# Patient Record
Sex: MALE | NOT HISPANIC OR LATINO | URBAN - METROPOLITAN AREA
[De-identification: names, ages, dates, MRNs, and addresses within clinical notes are randomized per-mention and may not be internally consistent; named-entity substitution may affect disease eponyms.]

---

## 2018-11-20 ENCOUNTER — IMPORTED ENCOUNTER (OUTPATIENT)
Dept: URBAN - METROPOLITAN AREA CLINIC 1 | Facility: CLINIC | Age: 53
End: 2018-11-20

## 2018-11-20 PROBLEM — H40.013: Noted: 2018-11-20

## 2018-11-20 PROBLEM — H25.13: Noted: 2018-11-20

## 2018-11-20 PROCEDURE — 92014 COMPRE OPH EXAM EST PT 1/>: CPT

## 2018-11-20 PROCEDURE — 92015 DETERMINE REFRACTIVE STATE: CPT

## 2018-11-20 NOTE — PATIENT DISCUSSION
1.  Cataracts OU -- Observe for now without intervention. The patient was advised to contact us if any change or worsening of vision. 2. Glaucoma Suspect OU (CD: 0.60 / 0.65) -- IOP stable today at 16 OU. Negative family h/o Glaucoma. Past w/u & testing was negative & WNL OU. Patient is considered Low Risk. Condition was discussed with patient and patient understands. Will continue to monitor patient for any progression in condition. Patient was advised to call us with any problems questions or concerns. Finalized Glasses MRx & given to patient today. Return for next available I&R appointment w/ Edelmira Kelley for an appointment in 3 WK for a CC OU (after I&R w/ Optical) with Dr. Dinah Leary.

## 2018-12-08 ENCOUNTER — IMPORTED ENCOUNTER (OUTPATIENT)
Dept: URBAN - METROPOLITAN AREA CLINIC 1 | Facility: CLINIC | Age: 53
End: 2018-12-08

## 2018-12-08 NOTE — PATIENT DISCUSSION
1.  CC today - Good fit good comfort. Va doing well finalized CTL RXReturn for an appointment in 1 year 30/cc with Dr. Christopher Acosta.

## 2019-12-02 ENCOUNTER — IMPORTED ENCOUNTER (OUTPATIENT)
Dept: URBAN - METROPOLITAN AREA CLINIC 1 | Facility: CLINIC | Age: 54
End: 2019-12-02

## 2019-12-02 PROBLEM — H40.013: Noted: 2019-12-02

## 2019-12-02 PROBLEM — H25.813: Noted: 2019-12-02

## 2019-12-02 PROCEDURE — 92015 DETERMINE REFRACTIVE STATE: CPT

## 2019-12-02 PROCEDURE — 92014 COMPRE OPH EXAM EST PT 1/>: CPT

## 2019-12-02 NOTE — PATIENT DISCUSSION
1.  Cataract OU: Observe for now without intervention. The patient was advised to contact us if any change or worsening of vision2. Glaucoma Suspect OU (CD 0.60/0.65): IOPP stable. Negative family hx. Past w/u negative. Patient is considered Low Risk. Condition was discussed with patient and patient understands. Will continue to monitor patient for any progression in condition. Patient was advised to call us with any problems questions or concerns. MRX for glasses given. CTL trials givenReturn for an appointment in Friday  with Dr. Unruly López.

## 2019-12-06 ENCOUNTER — IMPORTED ENCOUNTER (OUTPATIENT)
Dept: URBAN - METROPOLITAN AREA CLINIC 1 | Facility: CLINIC | Age: 54
End: 2019-12-06

## 2019-12-06 NOTE — PATIENT DISCUSSION
1. CC Today OU -- Good Fit Comfort and Vision OU. Finalized CTL Rx was given to patient today. Return for an appointment in 1 YR for a 27 / CC OU with Dr. Rosales Crockett. Discussed with patient best corrected vision will be achieved with glasses. Discussed with patient improving distance visual acuity may negatively affect near visual acuity and vice versa. Patient understands may need to wear OTC readers to read fine print.

## 2021-02-06 ENCOUNTER — IMPORTED ENCOUNTER (OUTPATIENT)
Dept: URBAN - METROPOLITAN AREA CLINIC 1 | Facility: CLINIC | Age: 56
End: 2021-02-06

## 2021-02-06 ENCOUNTER — PREPPED CHART (OUTPATIENT)
Dept: URBAN - METROPOLITAN AREA CLINIC 1 | Facility: CLINIC | Age: 56
End: 2021-02-06

## 2021-02-06 PROBLEM — H25.813: Noted: 2021-02-06

## 2021-02-06 PROBLEM — H40.013: Noted: 2021-02-06

## 2021-02-06 PROCEDURE — 99214 OFFICE O/P EST MOD 30 MIN: CPT

## 2021-02-06 PROCEDURE — 92015 DETERMINE REFRACTIVE STATE: CPT

## 2021-02-06 NOTE — PATIENT DISCUSSION
1.  Cataract OU: Observe for now without intervention. The patient was advised to contact us if any change or worsening of vision2. Glaucoma Suspect OU (CD 0.60/0.65): IOP stable at 15/14 today. Negative family hx. Past w/u negative. Patient is considered Low Risk. Condition was discussed with patient and patient understands. Will continue to monitor patient for any progression in condition. Patient was advised to call us with any problems questions or concerns. Plan to repeat OCT next year. MRX for glasses given. CTL Rx finalized and given to pt (Monovision: OD for distance OS for near). Return for an appointment in 1 year 30/OCT/cc with Dr. Chauncey Adams. no difficulties

## 2021-02-06 NOTE — PATIENT DISCUSSION
Observe for now without intervention. The patient was advised to contact office with any change or worsening of vision.

## 2022-02-10 ASSESSMENT — VISUAL ACUITY
OD_BAT: 20/60
OD_CC: J2
OS_CC: J2
OD_CC: 20/50
OS_BAT: 20/60
OS_CC: 20/30

## 2022-02-10 ASSESSMENT — TONOMETRY
OS_IOP_MMHG: 14
OD_IOP_MMHG: 15

## 2022-04-02 ASSESSMENT — VISUAL ACUITY
OD_SC: 20/20-1
OD_SC: 20/40
OS_CC: J1-2
OD_CC: J2
OS_CC: J1
OS_SC: 20/20
OD_CC: J1
OD_SC: 20/20
OS_CC: J1
OU_SC: 20/20-1
OS_CC: J2
OD_GLARE: 20/60
OS_CC: J2
OD_CC: J2
OS_SC: 20/30
OD_GLARE: 20/60
OU_CC: J1
OD_SC: 20/20
OS_SC: 20/25
OS_GLARE: 20/60
OS_GLARE: 20/60
OD_SC: 20/50

## 2022-04-02 ASSESSMENT — TONOMETRY
OS_IOP_MMHG: 16
OD_IOP_MMHG: 15
OD_IOP_MMHG: 14
OS_IOP_MMHG: 14
OD_IOP_MMHG: 16
OS_IOP_MMHG: 14

## 2022-04-02 ASSESSMENT — KERATOMETRY
OS_K1POWER_DIOPTERS: 43.50
OS_AXISANGLE2_DEGREES: 079
OD_AXISANGLE_DEGREES: 165
OD_K1POWER_DIOPTERS: 43.00
OS_K2POWER_DIOPTERS: 43.75
OD_K2POWER_DIOPTERS: 43.25
OD_AXISANGLE2_DEGREES: 075
OS_AXISANGLE_DEGREES: 169

## 2022-06-29 ENCOUNTER — COMPREHENSIVE EXAM (OUTPATIENT)
Dept: URBAN - METROPOLITAN AREA CLINIC 1 | Facility: CLINIC | Age: 57
End: 2022-06-29

## 2022-06-29 DIAGNOSIS — H25.813: ICD-10-CM

## 2022-06-29 DIAGNOSIS — H40.013: ICD-10-CM

## 2022-06-29 PROCEDURE — 92133 CPTRZD OPH DX IMG PST SGM ON: CPT

## 2022-06-29 PROCEDURE — 92015 DETERMINE REFRACTIVE STATE: CPT

## 2022-06-29 PROCEDURE — 92014 COMPRE OPH EXAM EST PT 1/>: CPT

## 2022-06-29 PROCEDURE — 92310-12 CONTACT LENS FITTING - 90

## 2022-06-29 ASSESSMENT — TONOMETRY
OS_IOP_MMHG: 15
OD_IOP_MMHG: 15

## 2022-06-29 ASSESSMENT — VISUAL ACUITY
OS_CC: J1
OD_CC: J1
OD_CC: 20/20
OS_CC: 20/20

## 2023-05-03 ENCOUNTER — EMERGENCY VISIT (OUTPATIENT)
Dept: URBAN - METROPOLITAN AREA CLINIC 1 | Facility: CLINIC | Age: 58
End: 2023-05-03

## 2023-05-03 DIAGNOSIS — H04.203: ICD-10-CM

## 2023-05-03 DIAGNOSIS — B30.9: ICD-10-CM

## 2023-05-03 PROCEDURE — 92012 INTRM OPH EXAM EST PATIENT: CPT

## 2023-05-03 ASSESSMENT — VISUAL ACUITY
OD_CC: 20/60
OS_CC: 20/30
OS_PH: 20/25

## 2023-05-03 ASSESSMENT — TONOMETRY: OS_IOP_MMHG: 15

## 2023-05-10 ENCOUNTER — FOLLOW UP (OUTPATIENT)
Dept: URBAN - METROPOLITAN AREA CLINIC 1 | Facility: CLINIC | Age: 58
End: 2023-05-10

## 2023-05-10 DIAGNOSIS — B30.9: ICD-10-CM

## 2023-05-10 PROCEDURE — 92012 INTRM OPH EXAM EST PATIENT: CPT

## 2023-05-10 ASSESSMENT — VISUAL ACUITY
OD_CC: 20/20
OS_CC: 20/30

## 2023-07-12 ENCOUNTER — COMPREHENSIVE EXAM (OUTPATIENT)
Dept: URBAN - METROPOLITAN AREA CLINIC 1 | Facility: CLINIC | Age: 58
End: 2023-07-12

## 2023-07-12 DIAGNOSIS — H40.013: ICD-10-CM

## 2023-07-12 DIAGNOSIS — H25.813: ICD-10-CM

## 2023-07-12 DIAGNOSIS — Z46.0: ICD-10-CM

## 2023-07-12 PROCEDURE — 92015 DETERMINE REFRACTIVE STATE: CPT

## 2023-07-12 PROCEDURE — 92310-2 LEVEL 2 CONTACT LENS MANAGEMENT

## 2023-07-12 PROCEDURE — 92014 COMPRE OPH EXAM EST PT 1/>: CPT

## 2023-07-12 ASSESSMENT — VISUAL ACUITY
OS_CC: 20/20
OD_CC: J1
OS_CC: J1+
OS_BAT: 20/30
OD_CC: 20/20
OD_BAT: 20/30

## 2023-07-12 ASSESSMENT — TONOMETRY
OS_IOP_MMHG: 14
OD_IOP_MMHG: 15

## 2025-05-01 ENCOUNTER — COMPREHENSIVE EXAM (OUTPATIENT)
Age: 60
End: 2025-05-01

## 2025-05-01 DIAGNOSIS — H52.222: ICD-10-CM

## 2025-05-01 DIAGNOSIS — Z46.0: ICD-10-CM

## 2025-05-01 DIAGNOSIS — H52.03: ICD-10-CM

## 2025-05-01 DIAGNOSIS — Z01.00: ICD-10-CM

## 2025-05-01 DIAGNOSIS — H52.4: ICD-10-CM

## 2025-05-01 PROCEDURE — 92014 COMPRE OPH EXAM EST PT 1/>: CPT

## 2025-05-01 PROCEDURE — 92310-3 LEVEL 3 SOFT LENS UPDATE

## 2025-05-01 PROCEDURE — 92015 DETERMINE REFRACTIVE STATE: CPT

## 2025-05-02 ENCOUNTER — COMPREHENSIVE EXAM (OUTPATIENT)
Age: 60
End: 2025-05-02

## 2025-05-02 DIAGNOSIS — H40.013: ICD-10-CM

## 2025-05-02 DIAGNOSIS — H25.813: ICD-10-CM

## 2025-05-02 PROCEDURE — 92133 CPTRZD OPH DX IMG PST SGM ON: CPT

## 2025-05-02 PROCEDURE — 92014 COMPRE OPH EXAM EST PT 1/>: CPT
